# Patient Record
Sex: FEMALE | Race: WHITE | Employment: FULL TIME | URBAN - METROPOLITAN AREA
[De-identification: names, ages, dates, MRNs, and addresses within clinical notes are randomized per-mention and may not be internally consistent; named-entity substitution may affect disease eponyms.]

---

## 2023-07-24 VITALS — BODY MASS INDEX: 40.97 KG/M2 | HEIGHT: 67 IN | WEIGHT: 261 LBS

## 2023-07-24 PROBLEM — O99.210 OBESITY AFFECTING PREGNANCY, ANTEPARTUM: Status: ACTIVE | Noted: 2023-05-11

## 2023-07-24 PROBLEM — E28.2 POLYCYSTIC OVARY SYNDROME: Status: ACTIVE | Noted: 2022-08-12

## 2023-07-24 PROBLEM — O99.340 MENTAL DISORDER AFFECTING PREGNANCY: Status: ACTIVE | Noted: 2023-05-11

## 2023-07-24 PROBLEM — O26.90 DISORDER OF PREGNANCY: Status: ACTIVE | Noted: 2023-05-11

## 2023-07-24 RX ORDER — FLUOXETINE 10 MG/1
CAPSULE ORAL
COMMUNITY
End: 2023-07-28

## 2023-07-27 PROBLEM — Z87.42 HISTORY OF POLYCYSTIC OVARIAN SYNDROME: Status: ACTIVE | Noted: 2022-08-12

## 2023-07-27 PROBLEM — O09.92 HIGH-RISK PREGNANCY IN SECOND TRIMESTER: Status: ACTIVE | Noted: 2023-07-27

## 2023-07-27 PROBLEM — O26.90 DISORDER OF PREGNANCY: Status: RESOLVED | Noted: 2023-05-11 | Resolved: 2023-07-27

## 2023-07-28 ENCOUNTER — ROUTINE PRENATAL (OUTPATIENT)
Dept: OBGYN CLINIC | Age: 28
End: 2023-07-28

## 2023-07-28 VITALS — DIASTOLIC BLOOD PRESSURE: 77 MMHG | SYSTOLIC BLOOD PRESSURE: 126 MMHG

## 2023-07-28 DIAGNOSIS — Z3A.21 21 WEEKS GESTATION OF PREGNANCY: ICD-10-CM

## 2023-07-28 DIAGNOSIS — O99.342 MENTAL DISORDER AFFECTING PREGNANCY IN SECOND TRIMESTER: ICD-10-CM

## 2023-07-28 DIAGNOSIS — E66.01 MORBID OBESITY WITH BMI OF 40.0-44.9, ADULT (HCC): ICD-10-CM

## 2023-07-28 DIAGNOSIS — O34.80 POLYCYSTIC OVARY AFFECTING PREGNANCY, ANTEPARTUM: ICD-10-CM

## 2023-07-28 DIAGNOSIS — O09.92 HIGH-RISK PREGNANCY IN SECOND TRIMESTER: Primary | ICD-10-CM

## 2023-07-28 DIAGNOSIS — Z87.42 HISTORY OF POLYCYSTIC OVARIAN SYNDROME: ICD-10-CM

## 2023-07-28 DIAGNOSIS — E28.2 POLYCYSTIC OVARY AFFECTING PREGNANCY, ANTEPARTUM: ICD-10-CM

## 2023-07-28 DIAGNOSIS — O99.210 OBESITY AFFECTING PREGNANCY, ANTEPARTUM: ICD-10-CM

## 2023-07-28 ASSESSMENT — PATIENT HEALTH QUESTIONNAIRE - PHQ9
SUM OF ALL RESPONSES TO PHQ QUESTIONS 1-9: 0
2. FEELING DOWN, DEPRESSED OR HOPELESS: 0
1. LITTLE INTEREST OR PLEASURE IN DOING THINGS: 0
SUM OF ALL RESPONSES TO PHQ QUESTIONS 1-9: 0
SUM OF ALL RESPONSES TO PHQ QUESTIONS 1-9: 0
SUM OF ALL RESPONSES TO PHQ9 QUESTIONS 1 & 2: 0
SUM OF ALL RESPONSES TO PHQ QUESTIONS 1-9: 0

## 2023-07-28 NOTE — PROGRESS NOTES
Positive predictive and negative predictive values for these tests were explained, questions answered. Patient understands that these are screening tests that only assesses risk for select abnormalities (trisomies 15, 25, and 21, and sex chromosome abnormalities (NIPT), as well as markers for placental health (ASHLEY-A) and risk for open neural tube defects (quad)). NIPT is designed for high risk populations, but should be considered by all patients who desire the current best option for screening for applicable genetic abnormalities. Limitations of technology discussed based on maternal age, technical aspects of tests, and maternal BMI reviewed. All questions answered and concerns discussed. Patient elected to proceed with Ultrasound only with no serum screening. Mental disorder affecting pregnancy 2023     Priority: Medium     Overview Note:     23 [facility]: Took Paxil until +UPT for a few weeks. Reports stable mood. Assessment & Plan Note:      Anxiety and Depression  The approach to depression and anxiety in pregnancy must look at the whole maternal-child cohort to assess risks and benefits.  depression is associated with an increased risk of multiple poor obstetrical outcomes, including spontaneous , bleeding, operative deliveries, and  birth. In a nationally representative survey in the [facility] that identified pregnant women with major depression, only 50 percent received treatment. Untreated disease causes maternal suffering and is associated with poor nutrition, comorbid substance use disorders, poor adherence with prenatal care, postpartum depression, impaired relationships between the mother and her infant and other family members, and an increased risk of suicide. It is important to assess the benefit of previous treatment in order to guide treatment selection.   If psychotherapy is indicated and the patient was successfully

## 2023-07-28 NOTE — PATIENT INSTRUCTIONS
Resources for Depression/Anxiety  Postpartum Support International (PSI). PSI Warmline:  6-113-095-4PPD (7685). WWW. POSTPARTUM. NET    Mom's IMPACTT  [email or web address]

## 2023-09-25 ENCOUNTER — ROUTINE PRENATAL (OUTPATIENT)
Dept: OBGYN CLINIC | Age: 28
End: 2023-09-25
Payer: COMMERCIAL

## 2023-09-25 VITALS — SYSTOLIC BLOOD PRESSURE: 129 MMHG | WEIGHT: 277.78 LBS | BODY MASS INDEX: 43.6 KG/M2 | DIASTOLIC BLOOD PRESSURE: 80 MMHG

## 2023-09-25 DIAGNOSIS — E28.2 POLYCYSTIC OVARY AFFECTING PREGNANCY, ANTEPARTUM: ICD-10-CM

## 2023-09-25 DIAGNOSIS — O34.80 POLYCYSTIC OVARY AFFECTING PREGNANCY, ANTEPARTUM: ICD-10-CM

## 2023-09-25 DIAGNOSIS — Z3A.29 29 WEEKS GESTATION OF PREGNANCY: ICD-10-CM

## 2023-09-25 DIAGNOSIS — O09.93 HIGH-RISK PREGNANCY IN THIRD TRIMESTER: Primary | ICD-10-CM

## 2023-09-25 DIAGNOSIS — O99.343 MENTAL DISORDER AFFECTING PREGNANCY IN THIRD TRIMESTER: ICD-10-CM

## 2023-09-25 DIAGNOSIS — O99.210 OBESITY AFFECTING PREGNANCY, ANTEPARTUM: ICD-10-CM

## 2023-09-25 DIAGNOSIS — E66.01 CLASS 3 OBESITY (HCC): ICD-10-CM

## 2023-09-25 PROCEDURE — 99214 OFFICE O/P EST MOD 30 MIN: CPT | Performed by: OBSTETRICS & GYNECOLOGY

## 2023-09-25 PROCEDURE — 76819 FETAL BIOPHYS PROFIL W/O NST: CPT | Performed by: OBSTETRICS & GYNECOLOGY

## 2023-09-25 PROCEDURE — 76816 OB US FOLLOW-UP PER FETUS: CPT | Performed by: OBSTETRICS & GYNECOLOGY

## 2023-09-25 RX ORDER — ASPIRIN 81 MG/1
81 TABLET ORAL DAILY
COMMUNITY

## 2023-09-25 ASSESSMENT — PATIENT HEALTH QUESTIONNAIRE - PHQ9
SUM OF ALL RESPONSES TO PHQ QUESTIONS 1-9: 1
2. FEELING DOWN, DEPRESSED OR HOPELESS: 0
1. LITTLE INTEREST OR PLEASURE IN DOING THINGS: 1
SUM OF ALL RESPONSES TO PHQ9 QUESTIONS 1 & 2: 1
SUM OF ALL RESPONSES TO PHQ QUESTIONS 1-9: 1

## 2023-09-25 NOTE — PROGRESS NOTES
pregnancy in third trimester 07/27/2023     Overview Note:     Renetta pt-neg carrier screen  7/28/2023 at [facility]: Normal anatomy/echo,  FRANTZ WNL. Declined NIPT  09/25/23 [facility]: Appropriate fetal growth; Normal repeat echo; AC   49%, Overall   57%, FRANTZ  cm, Dopplers WNL, BPP 8/8      F/U MFM as needed        Mental disorder affecting pregnancy 05/11/2023     Overview Note:     07/28/23 [facility]: Took Paxil until +UPT for a few weeks. Reports stable mood. Obesity affecting pregnancy, antepartum 05/11/2023     Overview Note:             Polycystic ovary affecting pregnancy, antepartum 08/12/2022     Overview Note:     07/28/23 [facility]: Took Letrozole x 6 mo prior to establishing with PREG; Begun by PCP on Metformin; subsequent spontaneous pregnancy; Stopped Metformin at 14 weeks. Passed early GCT. Additional plans and concerns as documented in problem list.   All questions answered and concerns discussed. Return if symptoms worsen or fail to improve. Jamel Garcia MD   An electronic signature was used to authenticate this note. I have spent 30 minutes reviewing previous notes, test results and face to face with the patient discussing the diagnosis and importance of compliance with the treatment plan, as well as documenting on the day of the visit 09/25/2023. Ultrasound findings were discussed separately and are not included in this time calculation.

## 2023-12-05 ENCOUNTER — HOSPITAL ENCOUNTER (INPATIENT)
Age: 28
LOS: 3 days | Discharge: HOME | End: 2023-12-08
Attending: STUDENT IN AN ORGANIZED HEALTH CARE EDUCATION/TRAINING PROGRAM | Admitting: STUDENT IN AN ORGANIZED HEALTH CARE EDUCATION/TRAINING PROGRAM
Payer: COMMERCIAL

## 2023-12-05 ENCOUNTER — APPOINTMENT (OUTPATIENT)
Dept: LABOR AND DELIVERY | Age: 28
End: 2023-12-05
Payer: COMMERCIAL

## 2023-12-05 PROBLEM — Z3A.39 39 WEEKS GESTATION OF PREGNANCY: Status: ACTIVE | Noted: 2023-12-05

## 2023-12-05 LAB
ABO + RH BLD: NORMAL
BLOOD GROUP ANTIBODIES SERPL: NORMAL
ERYTHROCYTE [DISTWIDTH] IN BLOOD BY AUTOMATED COUNT: 12.4 % (ref 11.9–14.6)
HCT VFR BLD AUTO: 38.5 % (ref 35.8–46.3)
HGB BLD-MCNC: 13 G/DL (ref 11.7–15.4)
MCH RBC QN AUTO: 29.5 PG (ref 26.1–32.9)
MCHC RBC AUTO-ENTMCNC: 33.8 G/DL (ref 31.4–35)
MCV RBC AUTO: 87.5 FL (ref 82–102)
NRBC # BLD: 0 K/UL (ref 0–0.2)
PLATELET # BLD AUTO: 347 K/UL (ref 150–450)
PMV BLD AUTO: 9.5 FL (ref 9.4–12.3)
RBC # BLD AUTO: 4.4 M/UL (ref 4.05–5.2)
SPECIMEN EXP DATE BLD: NORMAL
WBC # BLD AUTO: 9.4 K/UL (ref 4.3–11.1)

## 2023-12-05 PROCEDURE — 85027 COMPLETE CBC AUTOMATED: CPT

## 2023-12-05 PROCEDURE — 86901 BLOOD TYPING SEROLOGIC RH(D): CPT

## 2023-12-05 PROCEDURE — 36415 COLL VENOUS BLD VENIPUNCTURE: CPT

## 2023-12-05 PROCEDURE — 86850 RBC ANTIBODY SCREEN: CPT

## 2023-12-05 PROCEDURE — 86900 BLOOD TYPING SEROLOGIC ABO: CPT

## 2023-12-05 PROCEDURE — 1100000000 HC RM PRIVATE

## 2023-12-05 PROCEDURE — 59200 INSERT CERVICAL DILATOR: CPT

## 2023-12-05 PROCEDURE — 6370000000 HC RX 637 (ALT 250 FOR IP): Performed by: STUDENT IN AN ORGANIZED HEALTH CARE EDUCATION/TRAINING PROGRAM

## 2023-12-05 RX ORDER — SODIUM CHLORIDE 9 MG/ML
INJECTION, SOLUTION INTRAVENOUS PRN
Status: DISCONTINUED | OUTPATIENT
Start: 2023-12-05 | End: 2023-12-07

## 2023-12-05 RX ORDER — METHYLERGONOVINE MALEATE 0.2 MG/ML
200 INJECTION INTRAVENOUS PRN
Status: DISCONTINUED | OUTPATIENT
Start: 2023-12-05 | End: 2023-12-07

## 2023-12-05 RX ORDER — DOCUSATE SODIUM 100 MG/1
100 CAPSULE, LIQUID FILLED ORAL 2 TIMES DAILY
Status: DISCONTINUED | OUTPATIENT
Start: 2023-12-05 | End: 2023-12-07

## 2023-12-05 RX ORDER — SODIUM CHLORIDE 0.9 % (FLUSH) 0.9 %
5-40 SYRINGE (ML) INJECTION EVERY 12 HOURS SCHEDULED
Status: DISCONTINUED | OUTPATIENT
Start: 2023-12-05 | End: 2023-12-07

## 2023-12-05 RX ORDER — DEXTROSE, SODIUM CHLORIDE, SODIUM LACTATE, POTASSIUM CHLORIDE, AND CALCIUM CHLORIDE 5; .6; .31; .03; .02 G/100ML; G/100ML; G/100ML; G/100ML; G/100ML
INJECTION, SOLUTION INTRAVENOUS CONTINUOUS
Status: DISCONTINUED | OUTPATIENT
Start: 2023-12-05 | End: 2023-12-07

## 2023-12-05 RX ORDER — TRANEXAMIC ACID 10 MG/ML
1000 INJECTION, SOLUTION INTRAVENOUS
Status: ACTIVE | OUTPATIENT
Start: 2023-12-05 | End: 2023-12-06

## 2023-12-05 RX ORDER — MISOPROSTOL 200 UG/1
800 TABLET ORAL PRN
Status: DISCONTINUED | OUTPATIENT
Start: 2023-12-05 | End: 2023-12-07

## 2023-12-05 RX ORDER — SODIUM CHLORIDE, SODIUM LACTATE, POTASSIUM CHLORIDE, AND CALCIUM CHLORIDE .6; .31; .03; .02 G/100ML; G/100ML; G/100ML; G/100ML
1000 INJECTION, SOLUTION INTRAVENOUS PRN
Status: DISCONTINUED | OUTPATIENT
Start: 2023-12-05 | End: 2023-12-07

## 2023-12-05 RX ORDER — SODIUM CHLORIDE, SODIUM LACTATE, POTASSIUM CHLORIDE, AND CALCIUM CHLORIDE .6; .31; .03; .02 G/100ML; G/100ML; G/100ML; G/100ML
500 INJECTION, SOLUTION INTRAVENOUS PRN
Status: DISCONTINUED | OUTPATIENT
Start: 2023-12-05 | End: 2023-12-07

## 2023-12-05 RX ORDER — CARBOPROST TROMETHAMINE 250 UG/ML
250 INJECTION, SOLUTION INTRAMUSCULAR PRN
Status: DISCONTINUED | OUTPATIENT
Start: 2023-12-05 | End: 2023-12-07

## 2023-12-05 RX ORDER — SODIUM CHLORIDE 0.9 % (FLUSH) 0.9 %
5-40 SYRINGE (ML) INJECTION PRN
Status: DISCONTINUED | OUTPATIENT
Start: 2023-12-05 | End: 2023-12-07

## 2023-12-05 RX ORDER — ONDANSETRON 2 MG/ML
4 INJECTION INTRAMUSCULAR; INTRAVENOUS EVERY 6 HOURS PRN
Status: DISCONTINUED | OUTPATIENT
Start: 2023-12-05 | End: 2023-12-07

## 2023-12-05 RX ADMIN — Medication 50 MCG: at 19:49

## 2023-12-06 ENCOUNTER — ANESTHESIA EVENT (OUTPATIENT)
Dept: LABOR AND DELIVERY | Age: 28
End: 2023-12-06
Payer: COMMERCIAL

## 2023-12-06 ENCOUNTER — ANESTHESIA (OUTPATIENT)
Dept: LABOR AND DELIVERY | Age: 28
End: 2023-12-06
Payer: COMMERCIAL

## 2023-12-06 PROCEDURE — 6370000000 HC RX 637 (ALT 250 FOR IP): Performed by: STUDENT IN AN ORGANIZED HEALTH CARE EDUCATION/TRAINING PROGRAM

## 2023-12-06 PROCEDURE — 7210000100 HC LABOR FEE PER 1 HR

## 2023-12-06 PROCEDURE — 1100000000 HC RM PRIVATE

## 2023-12-06 PROCEDURE — 6360000002 HC RX W HCPCS: Performed by: NURSE ANESTHETIST, CERTIFIED REGISTERED

## 2023-12-06 PROCEDURE — 00HU33Z INSERTION OF INFUSION DEVICE INTO SPINAL CANAL, PERCUTANEOUS APPROACH: ICD-10-PCS | Performed by: STUDENT IN AN ORGANIZED HEALTH CARE EDUCATION/TRAINING PROGRAM

## 2023-12-06 PROCEDURE — 0HQ9XZZ REPAIR PERINEUM SKIN, EXTERNAL APPROACH: ICD-10-PCS | Performed by: OBSTETRICS & GYNECOLOGY

## 2023-12-06 PROCEDURE — 3E0DXGC INTRODUCTION OF OTHER THERAPEUTIC SUBSTANCE INTO MOUTH AND PHARYNX, EXTERNAL APPROACH: ICD-10-PCS | Performed by: OBSTETRICS & GYNECOLOGY

## 2023-12-06 PROCEDURE — 3E033VJ INTRODUCTION OF OTHER HORMONE INTO PERIPHERAL VEIN, PERCUTANEOUS APPROACH: ICD-10-PCS | Performed by: OBSTETRICS & GYNECOLOGY

## 2023-12-06 PROCEDURE — 2500000003 HC RX 250 WO HCPCS: Performed by: NURSE ANESTHETIST, CERTIFIED REGISTERED

## 2023-12-06 PROCEDURE — 6360000002 HC RX W HCPCS: Performed by: OBSTETRICS & GYNECOLOGY

## 2023-12-06 PROCEDURE — 6360000002 HC RX W HCPCS: Performed by: STUDENT IN AN ORGANIZED HEALTH CARE EDUCATION/TRAINING PROGRAM

## 2023-12-06 PROCEDURE — 4A1HXCZ MONITORING OF PRODUCTS OF CONCEPTION, CARDIAC RATE, EXTERNAL APPROACH: ICD-10-PCS | Performed by: OBSTETRICS & GYNECOLOGY

## 2023-12-06 PROCEDURE — 6360000002 HC RX W HCPCS: Performed by: REGISTERED NURSE

## 2023-12-06 PROCEDURE — 2580000003 HC RX 258: Performed by: OBSTETRICS & GYNECOLOGY

## 2023-12-06 PROCEDURE — 51701 INSERT BLADDER CATHETER: CPT

## 2023-12-06 PROCEDURE — 2580000003 HC RX 258: Performed by: STUDENT IN AN ORGANIZED HEALTH CARE EDUCATION/TRAINING PROGRAM

## 2023-12-06 PROCEDURE — 3700000025 EPIDURAL BLOCK: Performed by: ANESTHESIOLOGY

## 2023-12-06 PROCEDURE — 6370000000 HC RX 637 (ALT 250 FOR IP): Performed by: OBSTETRICS & GYNECOLOGY

## 2023-12-06 PROCEDURE — 7220000101 HC DELIVERY VAGINAL/SINGLE

## 2023-12-06 RX ORDER — ROPIVACAINE HYDROCHLORIDE 2 MG/ML
INJECTION, SOLUTION EPIDURAL; INFILTRATION; PERINEURAL PRN
Status: DISCONTINUED | OUTPATIENT
Start: 2023-12-06 | End: 2023-12-06 | Stop reason: SDUPTHER

## 2023-12-06 RX ORDER — FENTANYL CITRATE 50 UG/ML
INJECTION, SOLUTION INTRAMUSCULAR; INTRAVENOUS PRN
Status: DISCONTINUED | OUTPATIENT
Start: 2023-12-06 | End: 2023-12-06 | Stop reason: SDUPTHER

## 2023-12-06 RX ORDER — KETOROLAC TROMETHAMINE 30 MG/ML
30 INJECTION, SOLUTION INTRAMUSCULAR; INTRAVENOUS ONCE
Status: COMPLETED | OUTPATIENT
Start: 2023-12-06 | End: 2023-12-06

## 2023-12-06 RX ORDER — ROPIVACAINE HYDROCHLORIDE 2 MG/ML
INJECTION, SOLUTION EPIDURAL; INFILTRATION; PERINEURAL CONTINUOUS PRN
Status: DISCONTINUED | OUTPATIENT
Start: 2023-12-06 | End: 2023-12-06 | Stop reason: SDUPTHER

## 2023-12-06 RX ORDER — ROPIVACAINE HYDROCHLORIDE 5 MG/ML
INJECTION, SOLUTION EPIDURAL; INFILTRATION; PERINEURAL PRN
Status: DISCONTINUED | OUTPATIENT
Start: 2023-12-06 | End: 2023-12-06 | Stop reason: SDUPTHER

## 2023-12-06 RX ORDER — ACETAMINOPHEN 325 MG/1
650 TABLET ORAL EVERY 4 HOURS PRN
Status: DISCONTINUED | OUTPATIENT
Start: 2023-12-06 | End: 2023-12-07

## 2023-12-06 RX ORDER — MAGNESIUM CARB/ALUMINUM HYDROX 105-160MG
120 TABLET,CHEWABLE ORAL DAILY PRN
Status: DISCONTINUED | OUTPATIENT
Start: 2023-12-06 | End: 2023-12-07

## 2023-12-06 RX ORDER — LIDOCAINE HYDROCHLORIDE AND EPINEPHRINE 15; 5 MG/ML; UG/ML
INJECTION, SOLUTION EPIDURAL PRN
Status: DISCONTINUED | OUTPATIENT
Start: 2023-12-06 | End: 2023-12-06 | Stop reason: SDUPTHER

## 2023-12-06 RX ADMIN — SODIUM CHLORIDE, POTASSIUM CHLORIDE, SODIUM LACTATE AND CALCIUM CHLORIDE 1000 ML: 600; 310; 30; 20 INJECTION, SOLUTION INTRAVENOUS at 03:47

## 2023-12-06 RX ADMIN — KETOROLAC TROMETHAMINE 30 MG: 30 INJECTION, SOLUTION INTRAMUSCULAR at 22:30

## 2023-12-06 RX ADMIN — LIDOCAINE HYDROCHLORIDE,EPINEPHRINE BITARTRATE 3 ML: 15; .005 INJECTION, SOLUTION EPIDURAL; INFILTRATION; INTRACAUDAL; PERINEURAL at 03:51

## 2023-12-06 RX ADMIN — Medication 50 MCG: at 01:07

## 2023-12-06 RX ADMIN — ROPIVACAINE HYDROCHLORIDE 10 ML/HR: 2 INJECTION, SOLUTION EPIDURAL; INFILTRATION; PERINEURAL at 10:24

## 2023-12-06 RX ADMIN — Medication 166.7 ML: at 21:52

## 2023-12-06 RX ADMIN — OXYTOCIN 87.3 MILLI-UNITS/MIN: 10 INJECTION INTRAVENOUS at 22:30

## 2023-12-06 RX ADMIN — SODIUM CHLORIDE, SODIUM LACTATE, POTASSIUM CHLORIDE, CALCIUM CHLORIDE AND DEXTROSE MONOHYDRATE: 5; 600; 310; 30; 20 INJECTION, SOLUTION INTRAVENOUS at 12:20

## 2023-12-06 RX ADMIN — ACETAMINOPHEN 650 MG: 325 TABLET, FILM COATED ORAL at 19:32

## 2023-12-06 RX ADMIN — BUTORPHANOL TARTRATE 1 MG: 2 INJECTION, SOLUTION INTRAMUSCULAR; INTRAVENOUS at 00:16

## 2023-12-06 RX ADMIN — FENTANYL CITRATE 100 MCG: 50 INJECTION, SOLUTION INTRAMUSCULAR; INTRAVENOUS at 10:24

## 2023-12-06 RX ADMIN — ROPIVACAINE HYDROCHLORIDE 5 ML: 2 INJECTION, SOLUTION EPIDURAL; INFILTRATION; PERINEURAL at 03:54

## 2023-12-06 RX ADMIN — SODIUM CHLORIDE, SODIUM LACTATE, POTASSIUM CHLORIDE, CALCIUM CHLORIDE AND DEXTROSE MONOHYDRATE: 5; 600; 310; 30; 20 INJECTION, SOLUTION INTRAVENOUS at 04:05

## 2023-12-06 RX ADMIN — ROPIVACAINE HYDROCHLORIDE 5 ML: 5 INJECTION, SOLUTION EPIDURAL; INFILTRATION; PERINEURAL at 10:24

## 2023-12-06 RX ADMIN — ROPIVACAINE HYDROCHLORIDE 10 ML/HR: 2 INJECTION, SOLUTION EPIDURAL; INFILTRATION; PERINEURAL at 03:56

## 2023-12-06 RX ADMIN — OXYTOCIN 1 MILLI-UNITS/MIN: 10 INJECTION, SOLUTION INTRAMUSCULAR; INTRAVENOUS at 08:02

## 2023-12-06 RX ADMIN — ROPIVACAINE HYDROCHLORIDE 6 ML: 5 INJECTION, SOLUTION EPIDURAL; INFILTRATION; PERINEURAL at 18:47

## 2023-12-06 RX ADMIN — ROPIVACAINE HYDROCHLORIDE 5 ML: 2 INJECTION, SOLUTION EPIDURAL; INFILTRATION; PERINEURAL at 03:56

## 2023-12-06 RX ADMIN — LIDOCAINE HYDROCHLORIDE,EPINEPHRINE BITARTRATE 2 ML: 15; .005 INJECTION, SOLUTION EPIDURAL; INFILTRATION; INTRACAUDAL; PERINEURAL at 03:54

## 2023-12-06 RX ADMIN — SODIUM CHLORIDE, SODIUM LACTATE, POTASSIUM CHLORIDE, AND CALCIUM CHLORIDE 500 ML: 600; 310; 30; 20 INJECTION, SOLUTION INTRAVENOUS at 04:45

## 2023-12-06 RX ADMIN — OXYTOCIN 1 MILLI-UNITS/MIN: 10 INJECTION, SOLUTION INTRAMUSCULAR; INTRAVENOUS at 19:34

## 2023-12-06 RX ADMIN — FENTANYL CITRATE 100 MCG: 50 INJECTION, SOLUTION INTRAMUSCULAR; INTRAVENOUS at 18:47

## 2023-12-06 ASSESSMENT — PAIN SCALES - GENERAL
PAINLEVEL_OUTOF10: 6
PAINLEVEL_OUTOF10: 6
PAINLEVEL_OUTOF10: 8
PAINLEVEL_OUTOF10: 7
PAINLEVEL_OUTOF10: 0

## 2023-12-06 ASSESSMENT — PAIN DESCRIPTION - LOCATION
LOCATION: ABDOMEN
LOCATION: BACK
LOCATION: HEAD
LOCATION: BACK

## 2023-12-06 ASSESSMENT — PAIN DESCRIPTION - DESCRIPTORS
DESCRIPTORS: SHARP
DESCRIPTORS: ACHING
DESCRIPTORS: ACHING;DISCOMFORT

## 2023-12-06 NOTE — PROGRESS NOTES
Epidural placement per Dr Carine De Leon and JOSE E Moncada, CRNA    Test dose at 5221    Pt karen well

## 2023-12-06 NOTE — ANESTHESIA PRE PROCEDURE
Pulmonary:Negative Pulmonary ROS and normal exam  breath sounds clear to auscultation                             Cardiovascular:Negative CV ROS  Exercise tolerance: good (>4 METS),           Rhythm: regular  Rate: normal                    Neuro/Psych:   (+) depression/anxiety             GI/Hepatic/Renal:   (+) morbid obesity          Endo/Other: Negative Endo/Other ROS                    Abdominal:             Vascular: negative vascular ROS. Other Findings:           Anesthesia Plan      epidural     ASA 3             Anesthetic plan and risks discussed with patient. Plan discussed with CRNA.           Post-op pain plan if not by surgeon: continuous epidural            Kevin Malin MD   12/6/2023

## 2023-12-06 NOTE — H&P
History & Physical    Name: Maksim Jimenez  MRN: [id number]   SSN: Loy Poole     YOB: 1995   Age: 33 y.o. Sex: female       Subjective:     Estimated Date of Delivery: Estimated Date of Delivery: 23     OB History    Para Term  AB Living   1             SAB IAB Ectopic Molar Multiple Live Births                    # Outcome Date GA Lbr Mike/2nd Weight Sex Delivery Anes PTL Lv   1 Current                 Maksim Jimenez is admitted with pregnancy at 39w5d for induction of labor. Please see prenatal records for details. Past Medical History:   Diagnosis Date    Anxiety     Disorder of pregnancy 2023    Infertility, female       Social History     Socioeconomic History    Marital status:      Spouse name: None    Number of children: None    Years of education: None    Highest education level: None   Tobacco Use    Smoking status: Never    Smokeless tobacco: Never   Vaping Use    Vaping Use: Never used   Substance and Sexual Activity    Alcohol use: Not Currently    Drug use: Never     Social Determinants of Health     Food Insecurity: No Food Insecurity (2023)    Hunger Vital Sign     Worried About Running Out of Food in the Last Year: Never true     Ran Out of Food in the Last Year: Never true   Transportation Needs: No Transportation Needs (2023)    PRAPARE - Transportation     Lack of Transportation (Medical): No     Lack of Transportation (Non-Medical): No   Housing Stability: Low Risk  (2023)    Housing Stability Vital Sign     Unable to Pay for Housing in the Last Year: No     Number of Places Lived in the Last Year: 1     Unstable Housing in the Last Year: No      Allergies   Allergen Reactions    Shellfish-Derived Products      Throat closed up        History reviewed. No pertinent family history. No current facility-administered medications on file prior to encounter.      Current Outpatient Medications on File Prior to Encounter

## 2023-12-06 NOTE — ANESTHESIA PROCEDURE NOTES
Epidural Block    Patient location during procedure: OB  Start time: 12/6/2023 3:44 AM  End time: 12/6/2023 3:50 AM  Reason for block: labor epidural  Staffing  Performed: anesthesiologist   Anesthesiologist: Ela Martines MD  Performed by: Ela Martines MD  Authorized by: Ela Martines MD    Epidural  Patient position: sitting  Prep: ChloraPrep  Patient monitoring: continuous pulse ox and frequent blood pressure checks  Approach: midline  Location: L3-4  Injection technique: EDOUARD saline  Provider prep: mask and sterile gloves  Needle  Needle type: Tuohy   Needle gauge: 17 G  Epidural needle length (in): 10 cm. Catheter type: end hole  Catheter size: 19 G  Test dose: negativeCatheter Secured: tegaderm and tape (liquid adhesive)  Assessment  Hemodynamics: stable  Attempts: 1  Outcomes: patient tolerated procedure well and uncomplicated  Additional Notes  3 cc 1% lidocaine local at needle insertion site.     Risks discussed including damage to muscle or nerve, post-procedure headache, bleeding, infection  Preanesthetic Checklist  Completed: patient identified, IV checked, risks and benefits discussed, equipment checked, pre-op evaluation, timeout performed, anesthesia consent given, oxygen available and monitors applied/VS acknowledged

## 2023-12-06 NOTE — PLAN OF CARE
Problem: Vaginal Birth or  Section  Goal: Fetal and maternal status remain reassuring during the birth process  Description:  Birth OB-Pregnancy care plan goal which identifies if the fetal and maternal status remain reassuring during the birth process  2023 112 by Kenny Shaw RN  Outcome: Progressing  2023 by Kenny Shaw RN  Outcome: Progressing  2023 by Kenny Shaw RN  Outcome: Progressing  Flowsheets (Taken 2023 0702)  Fetal and Maternal Status Remain Reassuring During the Birth Process:   Monitor vital signs   Monitor fetal heart rate   Monitor uterine activity   Monitor labor progression (Vaginal delivery)     Problem: Pain  Goal: Verbalizes/displays adequate comfort level or baseline comfort level  2023 112 by Kenny Shaw RN  Outcome: Progressing  2023 by Kenny Shaw RN  Outcome: Progressing  2023 by Kenny Shaw RN  Outcome: Progressing  Flowsheets (Taken 2023 0702)  Verbalizes/displays adequate comfort level or baseline comfort level:   Encourage patient to monitor pain and request assistance   Assess pain using appropriate pain scale   Administer analgesics based on type and severity of pain and evaluate response   Implement non-pharmacological measures as appropriate and evaluate response   Consider cultural and social influences on pain and pain management   Notify Licensed Independent Practitioner if interventions unsuccessful or patient reports new pain     Problem: Infection - Adult  Goal: Absence of infection at discharge  2023 by Kenny Shaw RN  Outcome: Progressing  2023 by Kenny Shaw RN  Outcome: Progressing  Goal: Absence of infection during hospitalization  2023 111 by Kenny Shaw RN  Outcome: Progressing  2023 by Kenny Shaw RN  Outcome: Progressing  Goal: Absence of fever/infection during anticipated neutropenic period  2023 by Kenny Shaw

## 2023-12-07 LAB — RUBV IGG SERPL IA-ACNC: 52.8 IU/ML

## 2023-12-07 PROCEDURE — 6370000000 HC RX 637 (ALT 250 FOR IP): Performed by: OBSTETRICS & GYNECOLOGY

## 2023-12-07 PROCEDURE — 86762 RUBELLA ANTIBODY: CPT

## 2023-12-07 PROCEDURE — 7220000101 HC DELIVERY VAGINAL/SINGLE

## 2023-12-07 PROCEDURE — 7100000011 HC PHASE II RECOVERY - ADDTL 15 MIN

## 2023-12-07 PROCEDURE — 1100000000 HC RM PRIVATE

## 2023-12-07 PROCEDURE — 7210000100 HC LABOR FEE PER 1 HR

## 2023-12-07 PROCEDURE — 36415 COLL VENOUS BLD VENIPUNCTURE: CPT

## 2023-12-07 PROCEDURE — 7100000010 HC PHASE II RECOVERY - FIRST 15 MIN

## 2023-12-07 RX ORDER — POLYETHYLENE GLYCOL 3350 17 G/17G
17 POWDER, FOR SOLUTION ORAL DAILY
Status: DISCONTINUED | OUTPATIENT
Start: 2023-12-07 | End: 2023-12-08 | Stop reason: HOSPADM

## 2023-12-07 RX ORDER — SIMETHICONE 80 MG
80 TABLET,CHEWABLE ORAL EVERY 6 HOURS PRN
Status: DISCONTINUED | OUTPATIENT
Start: 2023-12-07 | End: 2023-12-08 | Stop reason: HOSPADM

## 2023-12-07 RX ORDER — OXYCODONE HYDROCHLORIDE 5 MG/1
5 TABLET ORAL EVERY 4 HOURS PRN
Status: DISCONTINUED | OUTPATIENT
Start: 2023-12-07 | End: 2023-12-08 | Stop reason: HOSPADM

## 2023-12-07 RX ORDER — SODIUM CHLORIDE 0.9 % (FLUSH) 0.9 %
5-40 SYRINGE (ML) INJECTION EVERY 12 HOURS SCHEDULED
Status: DISCONTINUED | OUTPATIENT
Start: 2023-12-07 | End: 2023-12-08 | Stop reason: HOSPADM

## 2023-12-07 RX ORDER — ONDANSETRON 2 MG/ML
4 INJECTION INTRAMUSCULAR; INTRAVENOUS EVERY 6 HOURS PRN
Status: DISCONTINUED | OUTPATIENT
Start: 2023-12-07 | End: 2023-12-08 | Stop reason: HOSPADM

## 2023-12-07 RX ORDER — ONDANSETRON 8 MG/1
8 TABLET, ORALLY DISINTEGRATING ORAL EVERY 8 HOURS PRN
Status: DISCONTINUED | OUTPATIENT
Start: 2023-12-07 | End: 2023-12-08 | Stop reason: HOSPADM

## 2023-12-07 RX ORDER — OXYCODONE HYDROCHLORIDE 5 MG/1
10 TABLET ORAL EVERY 4 HOURS PRN
Status: DISCONTINUED | OUTPATIENT
Start: 2023-12-07 | End: 2023-12-08 | Stop reason: HOSPADM

## 2023-12-07 RX ORDER — ACETAMINOPHEN 500 MG
1000 TABLET ORAL EVERY 6 HOURS
Status: DISCONTINUED | OUTPATIENT
Start: 2023-12-07 | End: 2023-12-08 | Stop reason: HOSPADM

## 2023-12-07 RX ORDER — SODIUM CHLORIDE 9 MG/ML
INJECTION, SOLUTION INTRAVENOUS PRN
Status: DISCONTINUED | OUTPATIENT
Start: 2023-12-07 | End: 2023-12-08 | Stop reason: HOSPADM

## 2023-12-07 RX ORDER — SODIUM CHLORIDE, SODIUM LACTATE, POTASSIUM CHLORIDE, CALCIUM CHLORIDE 600; 310; 30; 20 MG/100ML; MG/100ML; MG/100ML; MG/100ML
INJECTION, SOLUTION INTRAVENOUS CONTINUOUS
Status: DISCONTINUED | OUTPATIENT
Start: 2023-12-07 | End: 2023-12-08 | Stop reason: HOSPADM

## 2023-12-07 RX ORDER — LANOLIN
CREAM (ML) TOPICAL PRN
Status: DISCONTINUED | OUTPATIENT
Start: 2023-12-07 | End: 2023-12-08 | Stop reason: HOSPADM

## 2023-12-07 RX ORDER — DOCUSATE SODIUM 100 MG/1
100 CAPSULE, LIQUID FILLED ORAL 2 TIMES DAILY PRN
Status: DISCONTINUED | OUTPATIENT
Start: 2023-12-07 | End: 2023-12-08 | Stop reason: HOSPADM

## 2023-12-07 RX ORDER — FAMOTIDINE 20 MG/1
20 TABLET, FILM COATED ORAL 2 TIMES DAILY
Status: DISCONTINUED | OUTPATIENT
Start: 2023-12-07 | End: 2023-12-08 | Stop reason: HOSPADM

## 2023-12-07 RX ORDER — IBUPROFEN 800 MG/1
800 TABLET ORAL EVERY 8 HOURS
Status: DISCONTINUED | OUTPATIENT
Start: 2023-12-07 | End: 2023-12-08 | Stop reason: HOSPADM

## 2023-12-07 RX ORDER — SODIUM CHLORIDE 0.9 % (FLUSH) 0.9 %
5-40 SYRINGE (ML) INJECTION PRN
Status: DISCONTINUED | OUTPATIENT
Start: 2023-12-07 | End: 2023-12-08 | Stop reason: HOSPADM

## 2023-12-07 RX ADMIN — IBUPROFEN 800 MG: 800 TABLET, FILM COATED ORAL at 04:31

## 2023-12-07 RX ADMIN — FAMOTIDINE 20 MG: 20 TABLET ORAL at 20:03

## 2023-12-07 RX ADMIN — ACETAMINOPHEN 1000 MG: 500 TABLET, FILM COATED ORAL at 15:26

## 2023-12-07 RX ADMIN — FAMOTIDINE 20 MG: 20 TABLET ORAL at 07:52

## 2023-12-07 RX ADMIN — ACETAMINOPHEN 1000 MG: 500 TABLET, FILM COATED ORAL at 21:21

## 2023-12-07 RX ADMIN — ACETAMINOPHEN 1000 MG: 500 TABLET, FILM COATED ORAL at 00:49

## 2023-12-07 RX ADMIN — IBUPROFEN 800 MG: 800 TABLET, FILM COATED ORAL at 11:58

## 2023-12-07 RX ADMIN — Medication: at 05:46

## 2023-12-07 RX ADMIN — IBUPROFEN 800 MG: 800 TABLET, FILM COATED ORAL at 20:03

## 2023-12-07 RX ADMIN — ACETAMINOPHEN 1000 MG: 500 TABLET, FILM COATED ORAL at 07:52

## 2023-12-07 ASSESSMENT — PAIN SCALES - GENERAL
PAINLEVEL_OUTOF10: 2
PAINLEVEL_OUTOF10: 0

## 2023-12-07 ASSESSMENT — PAIN DESCRIPTION - LOCATION: LOCATION: ABDOMEN

## 2023-12-07 ASSESSMENT — PAIN DESCRIPTION - DESCRIPTORS: DESCRIPTORS: CRAMPING

## 2023-12-07 NOTE — L&D DELIVERY NOTE
Djiboutian Challenger, Girl Gentry Mortensen [[id number]]      Labor Events     Labor: No   Steroids: None  Cervical Ripening Date/Time:  23 19:49:00   Cervical Ripening Type: Misoprostol  Antibiotics Received during Labor: No  Rupture Date/Time:      Rupture Type: SROM  Fluid Color: Bloody Show, Clear  Fluid Odor: None  Induction: Misoprostol, Oxytocin              Anesthesia    Method: Epidural       Labor Event Times      Labor onset date/time:  23 03:30:00 EST     Dilation complete date/time:  23 16:06:00     Start pushing date/time:     Decision date/time (emergent ):            Delivery Details      Delivery Date: 23 Delivery Time: 21:47:00   Delivery Type: Vaginal, Spontaneous               Presentation    Presentation: Vertex  Position: Left  _: Occiput  _: Anterior       Shoulder Dystocia    Shoulder Dystocia Present?: No                                                                                                           Assisted Delivery Details    Forceps Attempted?: No  Vacuum Extractor Attempted?: No                                                             Cord    Vessels: 3 Vessels  Complications: Nuchal Loose  Cord Around: Head  Delayed Cord Clamping?: Yes  Cord Clamped Date/Time: 2023 21:48:00  Cord Blood Disposition: Lab  Gases Sent?: No   Cord Comments:  PROCEDURAL - OBSTETRIC - CORD OBSERVATION   x1             Placenta    Date/Time: 2023 21:51:00  Removal: Expressed  Appearance: Intact  Disposition: Discarded       Lacerations    Episiotomy: None  Perineal Lacerations: 1st  Other Lacerations: no non-perineal laceration  Number of Repair Packets: 1       Vaginal Counts    Initial Count Personnel: BAUTISTA,CST  Initial Count Verified By: Reggie Mcburney Sponge Count: Correct Intial Needles Count: Correct Intial Instruments Count: Correct   Final Sponges Count: Correct Final Needles  Count: Correct Final Instruments Count: Correct   Final Count

## 2023-12-07 NOTE — LACTATION NOTE
This note was copied from a baby's chart. Mom currently bottle feeding. States she wants to breastfeed, but had a long labor and wasn't sure what to do. Encouraged mom to call out for assistance at next feeding time prior to bottle feeding. Discussed all nursing staff can assist with breastfeeding, not just lactation.

## 2023-12-07 NOTE — PLAN OF CARE
Problem: Postpartum  Goal: Experiences normal postpartum course  Description:  Postpartum OB-Pregnancy care plan goal which identifies if the mother is experiencing a normal postpartum course  Outcome: Progressing  Goal: Appropriate maternal -  bonding  Description:  Postpartum OB-Pregnancy care plan goal which identifies if the mother and  are bonding appropriately  Outcome: Progressing  Goal: Establishment of infant feeding pattern  Description:  Postpartum OB-Pregnancy care plan goal which identifies if the mother is establishing a feeding pattern with their   Outcome: Progressing  Goal: Incisions, wounds, or drain sites healing without S/S of infection  Outcome: Progressing     Problem: Pain  Goal: Verbalizes/displays adequate comfort level or baseline comfort level  Outcome: Progressing     Problem: Infection - Adult  Goal: Absence of infection at discharge  Outcome: Progressing  Goal: Absence of infection during hospitalization  Outcome: Progressing

## 2023-12-08 VITALS
SYSTOLIC BLOOD PRESSURE: 126 MMHG | RESPIRATION RATE: 16 BRPM | HEIGHT: 66 IN | HEART RATE: 84 BPM | WEIGHT: 293 LBS | OXYGEN SATURATION: 98 % | BODY MASS INDEX: 47.09 KG/M2 | TEMPERATURE: 98.4 F | DIASTOLIC BLOOD PRESSURE: 78 MMHG

## 2023-12-08 PROBLEM — O99.210 OBESITY AFFECTING PREGNANCY, ANTEPARTUM: Status: RESOLVED | Noted: 2023-05-11 | Resolved: 2023-12-08

## 2023-12-08 PROBLEM — Z3A.39 39 WEEKS GESTATION OF PREGNANCY: Status: RESOLVED | Noted: 2023-12-05 | Resolved: 2023-12-08

## 2023-12-08 PROBLEM — E28.2 POLYCYSTIC OVARY AFFECTING PREGNANCY, ANTEPARTUM: Status: RESOLVED | Noted: 2022-08-12 | Resolved: 2023-12-08

## 2023-12-08 PROBLEM — O09.93 HIGH-RISK PREGNANCY IN THIRD TRIMESTER: Status: RESOLVED | Noted: 2023-07-27 | Resolved: 2023-12-08

## 2023-12-08 PROBLEM — O34.80 POLYCYSTIC OVARY AFFECTING PREGNANCY, ANTEPARTUM: Status: RESOLVED | Noted: 2022-08-12 | Resolved: 2023-12-08

## 2023-12-08 PROCEDURE — 6370000000 HC RX 637 (ALT 250 FOR IP): Performed by: OBSTETRICS & GYNECOLOGY

## 2023-12-08 RX ORDER — IBUPROFEN 800 MG/1
800 TABLET ORAL EVERY 8 HOURS
Qty: 120 TABLET | Refills: 3 | Status: SHIPPED | OUTPATIENT
Start: 2023-12-08

## 2023-12-08 RX ADMIN — IBUPROFEN 800 MG: 800 TABLET, FILM COATED ORAL at 03:28

## 2023-12-08 RX ADMIN — ACETAMINOPHEN 1000 MG: 500 TABLET, FILM COATED ORAL at 03:28

## 2023-12-08 NOTE — DISCHARGE INSTRUCTIONS
Discharge instruction to follow: Activity: Pelvis rest for 6 weeks     No heavy lifting over 15 lbs for 2 weeks     No driving for 2 weeks     No push/pull motion such as sweeping or vacuuming for 2 weeks     No tub baths for 6 weeks    Continue using the tom-bottle after each void or bowel movement. If using sitz bath continue until comfortable stopping. If using tom-bottle continue to use until comfortable stopping. Change sanitary pad after each urination or bowel movement. Call MD for the following:      Fever over 101 F; pain not relieved by medication; foul smelling vaginal discharge or an increase in vaginal bleeding. Take medication as prescribed. Follow up with MD as order. Vaginal Childbirth: Care Instructions  Overview     Vaginal birth means delivering a baby through the birth canal (vagina). During labor, the uterus tightens (contracts) regularly to thin and open the cervix and to push the baby out through the birth canal.  Your body will slowly heal in the next few weeks. It's easy to get too tired and overwhelmed during the first weeks after your baby is born. Changes in your hormones can shift your mood without warning. You may find it hard to meet the extra demands on your energy and time. Take it easy on yourself. Follow-up care is a key part of your treatment and safety. Be sure to make and go to all appointments, and call your doctor if you are having problems. It's also a good idea to know your test results and keep a list of the medicines you take. How can you care for yourself at home? Vaginal bleeding and cramps  After delivery, you will have a bloody discharge from your vagina. This will turn pink within a week and then white or yellow after about 10 days. It may last for 2 to 4 weeks or longer, until the uterus has healed. Use sanitary pads until you stop bleeding. Using pads makes it easier to monitor your bleeding.   Don't worry if you pass some blood clots, as take naps when your baby sleeps during the day. Get some exercise every day. But don't do any heavy exercise until your doctor says it is okay. Wait until you are healed (about 4 to 6 weeks) before you have sexual intercourse. Your doctor will tell you when it is okay to have sex. If you don't want to get pregnant, talk to your doctor about birth control. You can get pregnant even before your period returns. Also, you can get pregnant while you are breastfeeding. Mental health  It's normal to have some sadness, anxiety, sleeplessness, and mood swings after you go home. If you feel upset or hopeless for more than a few days or are having trouble doing the things you need to do, talk to your doctor. Constipation and hemorrhoids  Drink plenty of fluids. If you have kidney, heart, or liver disease and have to limit fluids, talk with your doctor before you increase the amount of fluids you drink. Eat plenty of fiber each day. Have a bran muffin or bran cereal for breakfast. Try eating a piece of fruit for a mid-afternoon snack. For painful, itchy hemorrhoids, put ice or a cold pack on the area several times a day for 10 minutes at a time. Follow this by putting a warm compress on the area for another 10 to 20 minutes or by sitting in a shallow, warm bath. When should you call for help? Share this information with your partner, family, or a friend. They can help you watch for warning signs. Call 911  anytime you think you may need emergency care. For example, call if:    You have thoughts of harming yourself, your baby, or another person. You passed out (lost consciousness). You have chest pain, are short of breath, or cough up blood. You have a seizure. Call your doctor now or seek immediate medical care if:    You have signs of hemorrhage (too much bleeding), such as:  Heavy vaginal bleeding. This means that you are soaking through one or more pads in an hour.  Or you pass blood clots bigger than

## 2023-12-08 NOTE — PROGRESS NOTES
Shift assessment complete as noted. Patient denies needs. Questions encouraged and answered. Encouraged to call for needs or concerns. Verbalizes understanding. Plan of care reviewed and white boardu pdated. EPDS given for pt to fill out.   Pumping initiated per mother's request.

## 2023-12-08 NOTE — LACTATION NOTE
This note was copied from a baby's chart. Individualized Feeding Plan for Pumping and Bottle Feeding  Lactation Services [phone]    As much as possible, hold your baby on your chest so baby's bare skin is against your bare skin with a blanket covering baby's back, especially 30 minutes before it is time for baby to eat. Watch for early feeding cues such as, licking lips, sucking motions, rooting, hands to mouth. Crying is a late feeding cue. Feed your baby at least 8 times in 24 hours, or more if your baby is showing feeding cues. If baby is sleepy put baby skin to skin and watch for hunger cues. To rouse baby: unwrap, undress, massage hands, feet, & back, change diaper, gently change baby's position from lying to sitting. Put baby to breast as desired. If not latching, pump and bottle feed. __x__1. Double pump for 15 minutes with breast massage and compression. Hand express for an additional 2-3 minutes per side. Pump after each feeding attempt or poor feeding, up to 8 times per day. If you are not putting baby to the breast you need to pump 8 times a day. Pump every 3 hours. __x__2. Give baby all of the breast milk you obtain, make up feeding volume difference with formula as needed. Suggest pumping while someone else is bottle feeding if possible. Give all pumped milk first at next feeding. Fresh breastmilk can be left on the counter up to 6 hours. AVERAGE INTAKES OF COLOSTRUM BY HEALTHY  INFANTS:  Time  Day Intake (ml/feed)  Based on 8 feedings per day. 1st 24 hrs  1 2-10 ml  24-48 hrs  2 5-15 ml  48-72 hrs  3 15-30 ml (0.5-1 oz)  72-96 hrs  4 30-45 ml (1-1.5oz)                          5-6      45-60 ml (1.5-2oz)                           7          up to 75 ml (2.5 oz)     By day 7, baby will need up to up to 75 ml or 2.5 oz at each feeding based on 8 feedings per day & baby's weight. (1oz = 30ml).   Total milk volume needed in 24 hours by Day 7 is 18-20 oz per day

## 2023-12-08 NOTE — DISCHARGE SUMMARY
Obstetrical Discharge Summary     Name: Prema Caicedo  MRN: [id number]   SSN: Christine Ivory     YOB: 1995   Age: 33 y.o. Sex: female       Allergies: Allergies   Allergen Reactions    Shellfish-Derived Products      Throat closed up        Admit Date:  2023     Discharge Date:   2023    Admitting Physician: Eva Tom     Attending Physician:  Je Raza MD     * Admission Diagnoses:  39 weeks gestation of pregnancy [Z3A.39]     * Discharge Diagnoses: Clementeen Chimera Girl Walter Hannah [[id number]]      [place] Information    Head delivery date/time: 2023 21:47:00   Changing the 's delivery date/time could affect patient care.:      Delivery date/time:  23   Delivery type: Vaginal, Spontaneous    Details: Additional Diagnoses: Active Problems:    * No active hospital problems. *  Normal vaginal delivery          Immunization History   Administered Date(s) Administered    COVID-19, PFIZER PURPLE top, DILUTE for use, (age 15 y+), 30mcg/0.3mL 2021, 2021        * Procedures: No admission procedures for hospital encounter. * Discharge Condition: Good    [place] Hospital Course: Normal hospital course following the delivery. * Disposition: Home    Discharge Medications:      Medication List        START taking these medications      ibuprofen 800 MG tablet  Commonly known as: ADVIL;MOTRIN  Take 1 tablet by mouth every 8 (eight) hours            CONTINUE taking these medications      PRENATAL 1+1 PO     TUMS E-X PO            STOP taking these medications      aspirin 81 MG EC tablet               Where to Get Your Medications        These medications were sent to [address] [place] [address], [address] [place] [address], [address] [place] [address]      Phone: [phone]   ibuprofen 800 MG tablet          * Follow-up Care/Patient Instructions:   Activity:

## 2023-12-08 NOTE — PLAN OF CARE
Problem: Vaginal Birth or  Section  Goal: Fetal and maternal status remain reassuring during the birth process  Description:  Birth OB-Pregnancy care plan goal which identifies if the fetal and maternal status remain reassuring during the birth process  Outcome: Progressing     Problem: Postpartum  Goal: Experiences normal postpartum course  Description:  Postpartum OB-Pregnancy care plan goal which identifies if the mother is experiencing a normal postpartum course  2023 by Hammad Nam RN  Outcome: Progressing  2023 by Flower White RN  Outcome: Progressing  Goal: Appropriate maternal -  bonding  Description:  Postpartum OB-Pregnancy care plan goal which identifies if the mother and  are bonding appropriately  2023 by Hammad Nam RN  Outcome: Progressing  2023 by Flower White RN  Outcome: Progressing  Goal: Establishment of infant feeding pattern  Description:  Postpartum OB-Pregnancy care plan goal which identifies if the mother is establishing a feeding pattern with their   2023 by Hammad Nam RN  Outcome: Progressing  2023 by Flower White RN  Outcome: Progressing  Goal: Incisions, wounds, or drain sites healing without S/S of infection  2023 by Hammad Nam RN  Outcome: Progressing  Flowsheets (Taken 2023)  Incisions, Wounds, or Drain Sites Healing Without Sign and Symptoms of Infection:   ADMISSION and DAILY: Assess and document risk factors for pressure ulcer development   TWICE DAILY: Assess and document skin integrity  2023 by Flower White RN  Outcome: Progressing     Problem: Pain  Goal: Verbalizes/displays adequate comfort level or baseline comfort level  2023 by Hammad Nam RN  Outcome: Progressing  Flowsheets (Taken 2023)  Verbalizes/displays adequate comfort level or baseline comfort level:

## 2023-12-08 NOTE — CARE COORDINATION
Chart reviewed - anxiety; first time parent. SW met with patient to complete initial assessment. Patient states that she's been diagnosed with generalized anxiety. She was on Lexapro prior to pregnancy, and then she discontinued this medication when she became pregnant. Patient plans to resume the Lexapro at this time as it managed her anxiety well in the past.     provided education on [facility]  Home Visit Program.  Family was undecided on need for home visit. No referral will be made at this time. Family has this 's contact information should they decide to participate in program.    Patient given informational packet on  mood & anxiety disorders (resources/education). Family denies any additional needs from  at this time. Family has 's contact information should any needs/questions arise.     DAMON Ricketts, [address] [place] [address]   [phone]

## 2025-08-26 ENCOUNTER — OFFICE VISIT (OUTPATIENT)
Dept: SURGERY | Age: 30
End: 2025-08-26
Payer: COMMERCIAL

## 2025-08-26 VITALS
SYSTOLIC BLOOD PRESSURE: 135 MMHG | HEART RATE: 66 BPM | BODY MASS INDEX: 43.3 KG/M2 | DIASTOLIC BLOOD PRESSURE: 88 MMHG | HEIGHT: 66 IN | WEIGHT: 269.4 LBS

## 2025-08-26 DIAGNOSIS — K80.10 CALCULUS OF GALLBLADDER WITH CHOLECYSTITIS WITHOUT BILIARY OBSTRUCTION, UNSPECIFIED CHOLECYSTITIS ACUITY: Primary | ICD-10-CM

## 2025-08-26 DIAGNOSIS — E66.01 MORBID OBESITY (HCC): ICD-10-CM

## 2025-08-26 PROBLEM — K80.20 CHOLELITHIASIS: Status: ACTIVE | Noted: 2025-08-26

## 2025-08-26 PROCEDURE — 99203 OFFICE O/P NEW LOW 30 MIN: CPT | Performed by: SURGERY

## 2025-08-26 RX ORDER — FLUOXETINE 10 MG/1
10 CAPSULE ORAL DAILY
COMMUNITY